# Patient Record
Sex: FEMALE | URBAN - METROPOLITAN AREA
[De-identification: names, ages, dates, MRNs, and addresses within clinical notes are randomized per-mention and may not be internally consistent; named-entity substitution may affect disease eponyms.]

---

## 2020-09-17 ENCOUNTER — TRANSCRIBE ORDERS (OUTPATIENT)
Dept: ADMINISTRATIVE | Facility: HOSPITAL | Age: 35
End: 2020-09-17

## 2020-09-17 DIAGNOSIS — R10.2 PERINEAL NEURALGIA, UNSPECIFIED LATERALITY: Primary | ICD-10-CM

## 2025-04-24 ENCOUNTER — OFFICE VISIT (OUTPATIENT)
Dept: DENTISTRY | Facility: CLINIC | Age: 40
End: 2025-04-24

## 2025-04-24 DIAGNOSIS — Z01.20 ENCOUNTER FOR DENTAL EXAMINATION: Primary | ICD-10-CM

## 2025-04-24 PROCEDURE — D0140 LIMITED ORAL EVALUATION - PROBLEM FOCUSED: HCPCS

## 2025-04-24 PROCEDURE — D0220 INTRAORAL - PERIAPICAL FIRST RADIOGRAPHIC IMAGE: HCPCS

## 2025-04-24 NOTE — PROGRESS NOTES
Procedure Details  9  - INTRAORAL - PERIAPICAL FIRST RADIOGRAPHIC IMAGE   - LIMITED ORAL EVALUATION - PROBLEM FOCUSED    Limited Exam    Opal Soto 39 y.o. female presents with self to [facility] for Limited exam  PMH reviewed, no changes, ASA I.     Chief complaint:  My tooth bothers me because I get food stuck there sometimes    Consent:  Discussed that limited exam focuses on problem area, and same day tx is not guaranteed.  Patient explained to if they wish to have anything else evaluated, they need to return to the practice at which they are a patient of record or schedule a comprehensive exam afterwards.  Patient understands and consent was given by self via verbal consent.    Objective clinical findings:   Oral cancer screening: normal.   Extraoral exam: no remarkable findings.  Intraoral exam: no remarkable findings.     Radiographs: Single PA - 9 .     Assessment:  #9 presents with previous RCT with temporary cavit.    Plan:   Limited exam completed. Explained to patient she is getting food caught there due to crowding. Explained to patient that she can return for comp exam and afterwards she can get a crown to help with this. In addition, it will help with the color of tooth as she is not happy with current shade. Patient understands.     Referral(s): None needed.  Rx: None.  Comprehensive care disposition: Patient denies being a patient of record anywhere and was encouraged to establish comprehensive dental care somewhere, whether it be at one of the  dental clinics or another practice of choice.    Patient dismissed ambulatory and alert.    NV: comp exam.    Attending: Dr. Alicea was present in clinic.